# Patient Record
Sex: FEMALE | Race: WHITE | NOT HISPANIC OR LATINO | URBAN - METROPOLITAN AREA
[De-identification: names, ages, dates, MRNs, and addresses within clinical notes are randomized per-mention and may not be internally consistent; named-entity substitution may affect disease eponyms.]

---

## 2017-06-26 ENCOUNTER — OFFICE VISIT - HEALTHEAST (OUTPATIENT)
Dept: FAMILY MEDICINE | Facility: CLINIC | Age: 37
End: 2017-06-26

## 2017-06-26 DIAGNOSIS — N97.9 FEMALE FERTILITY PROBLEM: ICD-10-CM

## 2017-06-26 DIAGNOSIS — M54.9 BACKACHE: ICD-10-CM

## 2017-06-26 DIAGNOSIS — M54.2 CERVICALGIA: ICD-10-CM

## 2017-06-26 ASSESSMENT — MIFFLIN-ST. JEOR: SCORE: 1525.9

## 2017-07-12 ENCOUNTER — HOSPITAL ENCOUNTER (OUTPATIENT)
Dept: PHYSICAL MEDICINE AND REHAB | Facility: CLINIC | Age: 37
Discharge: HOME | End: 2017-07-12
Attending: FAMILY MEDICINE

## 2017-07-12 DIAGNOSIS — M54.50 CHRONIC BILATERAL LOW BACK PAIN WITHOUT SCIATICA: ICD-10-CM

## 2017-07-12 DIAGNOSIS — M79.18 MYOFASCIAL PAIN: ICD-10-CM

## 2017-07-12 DIAGNOSIS — G89.29 CHRONIC NECK PAIN: ICD-10-CM

## 2017-07-12 DIAGNOSIS — G89.29 CHRONIC BILATERAL LOW BACK PAIN WITHOUT SCIATICA: ICD-10-CM

## 2017-07-12 DIAGNOSIS — M54.2 CHRONIC NECK PAIN: ICD-10-CM

## 2017-07-12 DIAGNOSIS — R29.898 MUSCULAR DECONDITIONING: ICD-10-CM

## 2017-07-12 RX ORDER — IBUPROFEN 200 MG
200 TABLET ORAL EVERY 6 HOURS
Status: SHIPPED | COMMUNITY
Start: 2017-07-12

## 2017-07-12 ASSESSMENT — MIFFLIN-ST. JEOR: SCORE: 1525.9

## 2017-07-14 ENCOUNTER — HOSPITAL ENCOUNTER (OUTPATIENT)
Dept: PHYSICAL MEDICINE AND REHAB | Facility: CLINIC | Age: 37
Discharge: HOME | End: 2017-07-14
Attending: PHYSICIAN ASSISTANT

## 2017-07-14 DIAGNOSIS — M99.04 SACRAL REGION SOMATIC DYSFUNCTION: ICD-10-CM

## 2017-07-14 DIAGNOSIS — M99.02 SEGMENTAL DYSFUNCTION OF THORACIC REGION: ICD-10-CM

## 2017-07-14 DIAGNOSIS — M54.2 CHRONIC NECK PAIN: ICD-10-CM

## 2017-07-14 DIAGNOSIS — M79.18 MYOFASCIAL PAIN: ICD-10-CM

## 2017-07-14 DIAGNOSIS — M99.01 CERVICAL SEGMENT DYSFUNCTION: ICD-10-CM

## 2017-07-14 DIAGNOSIS — G89.29 CHRONIC BILATERAL LOW BACK PAIN WITHOUT SCIATICA: ICD-10-CM

## 2017-07-14 DIAGNOSIS — M54.50 CHRONIC BILATERAL LOW BACK PAIN WITHOUT SCIATICA: ICD-10-CM

## 2017-07-14 DIAGNOSIS — M99.04 SACROILIAC JOINT SOMATIC DYSFUNCTION: ICD-10-CM

## 2017-07-14 DIAGNOSIS — G89.29 CHRONIC NECK PAIN: ICD-10-CM

## 2017-07-14 DIAGNOSIS — M99.03 SEGMENTAL DYSFUNCTION OF LUMBAR REGION: ICD-10-CM

## 2017-07-17 ENCOUNTER — OFFICE VISIT - HEALTHEAST (OUTPATIENT)
Dept: PHYSICAL THERAPY | Facility: CLINIC | Age: 37
End: 2017-07-17

## 2017-07-17 DIAGNOSIS — M54.2 NECK PAIN: ICD-10-CM

## 2017-07-17 DIAGNOSIS — M54.50 CHRONIC BILATERAL LOW BACK PAIN WITHOUT SCIATICA: ICD-10-CM

## 2017-07-17 DIAGNOSIS — G89.29 CHRONIC BILATERAL LOW BACK PAIN WITHOUT SCIATICA: ICD-10-CM

## 2017-07-17 DIAGNOSIS — R29.898 NECK TIGHTNESS: ICD-10-CM

## 2017-07-17 DIAGNOSIS — M62.81 GENERALIZED MUSCLE WEAKNESS: ICD-10-CM

## 2017-07-18 ENCOUNTER — HOSPITAL ENCOUNTER (OUTPATIENT)
Dept: PHYSICAL MEDICINE AND REHAB | Facility: CLINIC | Age: 37
Discharge: HOME | End: 2017-07-18
Attending: PHYSICIAN ASSISTANT

## 2017-07-18 DIAGNOSIS — M99.04 SACRAL REGION SOMATIC DYSFUNCTION: ICD-10-CM

## 2017-07-18 DIAGNOSIS — G89.29 CHRONIC BILATERAL LOW BACK PAIN WITHOUT SCIATICA: ICD-10-CM

## 2017-07-18 DIAGNOSIS — G89.29 CHRONIC NECK PAIN: ICD-10-CM

## 2017-07-18 DIAGNOSIS — M54.50 CHRONIC BILATERAL LOW BACK PAIN WITHOUT SCIATICA: ICD-10-CM

## 2017-07-18 DIAGNOSIS — M99.04 SACROILIAC JOINT SOMATIC DYSFUNCTION: ICD-10-CM

## 2017-07-18 DIAGNOSIS — M99.02 SEGMENTAL DYSFUNCTION OF THORACIC REGION: ICD-10-CM

## 2017-07-18 DIAGNOSIS — M99.01 CERVICAL SEGMENT DYSFUNCTION: ICD-10-CM

## 2017-07-18 DIAGNOSIS — M54.2 CHRONIC NECK PAIN: ICD-10-CM

## 2017-07-18 DIAGNOSIS — M79.18 MYOFASCIAL PAIN: ICD-10-CM

## 2017-07-18 DIAGNOSIS — M99.03 SEGMENTAL DYSFUNCTION OF LUMBAR REGION: ICD-10-CM

## 2017-07-21 ENCOUNTER — OFFICE VISIT - HEALTHEAST (OUTPATIENT)
Dept: PHYSICAL THERAPY | Facility: CLINIC | Age: 37
End: 2017-07-21

## 2017-07-21 DIAGNOSIS — M62.81 GENERALIZED MUSCLE WEAKNESS: ICD-10-CM

## 2017-07-21 DIAGNOSIS — G89.29 CHRONIC BILATERAL LOW BACK PAIN WITHOUT SCIATICA: ICD-10-CM

## 2017-07-21 DIAGNOSIS — M54.50 CHRONIC BILATERAL LOW BACK PAIN WITHOUT SCIATICA: ICD-10-CM

## 2017-07-21 DIAGNOSIS — R29.898 NECK TIGHTNESS: ICD-10-CM

## 2017-07-21 DIAGNOSIS — M54.2 NECK PAIN: ICD-10-CM

## 2017-08-04 ENCOUNTER — OFFICE VISIT - HEALTHEAST (OUTPATIENT)
Dept: PHYSICAL THERAPY | Facility: CLINIC | Age: 37
End: 2017-08-04

## 2017-08-04 DIAGNOSIS — M54.2 NECK PAIN: ICD-10-CM

## 2017-08-04 DIAGNOSIS — G89.29 CHRONIC BILATERAL LOW BACK PAIN WITHOUT SCIATICA: ICD-10-CM

## 2017-08-04 DIAGNOSIS — M54.50 CHRONIC BILATERAL LOW BACK PAIN WITHOUT SCIATICA: ICD-10-CM

## 2017-08-07 ENCOUNTER — OFFICE VISIT - HEALTHEAST (OUTPATIENT)
Dept: PHYSICAL THERAPY | Facility: CLINIC | Age: 37
End: 2017-08-07

## 2017-08-07 DIAGNOSIS — R29.898 NECK TIGHTNESS: ICD-10-CM

## 2017-08-07 DIAGNOSIS — G89.29 CHRONIC BILATERAL LOW BACK PAIN WITHOUT SCIATICA: ICD-10-CM

## 2017-08-07 DIAGNOSIS — M54.50 CHRONIC BILATERAL LOW BACK PAIN WITHOUT SCIATICA: ICD-10-CM

## 2017-08-07 DIAGNOSIS — M54.2 NECK PAIN: ICD-10-CM

## 2017-08-07 DIAGNOSIS — M62.81 GENERALIZED MUSCLE WEAKNESS: ICD-10-CM

## 2017-08-11 ENCOUNTER — OFFICE VISIT - HEALTHEAST (OUTPATIENT)
Dept: PHYSICAL THERAPY | Facility: CLINIC | Age: 37
End: 2017-08-11

## 2017-08-11 DIAGNOSIS — M54.50 CHRONIC BILATERAL LOW BACK PAIN WITHOUT SCIATICA: ICD-10-CM

## 2017-08-11 DIAGNOSIS — R29.898 NECK TIGHTNESS: ICD-10-CM

## 2017-08-11 DIAGNOSIS — M54.2 NECK PAIN: ICD-10-CM

## 2017-08-11 DIAGNOSIS — G89.29 CHRONIC BILATERAL LOW BACK PAIN WITHOUT SCIATICA: ICD-10-CM

## 2017-08-11 DIAGNOSIS — M62.81 GENERALIZED MUSCLE WEAKNESS: ICD-10-CM

## 2017-08-14 ENCOUNTER — OFFICE VISIT - HEALTHEAST (OUTPATIENT)
Dept: PHYSICAL THERAPY | Facility: CLINIC | Age: 37
End: 2017-08-14

## 2017-08-14 DIAGNOSIS — M54.50 CHRONIC BILATERAL LOW BACK PAIN WITHOUT SCIATICA: ICD-10-CM

## 2017-08-14 DIAGNOSIS — M62.81 GENERALIZED MUSCLE WEAKNESS: ICD-10-CM

## 2017-08-14 DIAGNOSIS — G89.29 CHRONIC BILATERAL LOW BACK PAIN WITHOUT SCIATICA: ICD-10-CM

## 2017-08-14 DIAGNOSIS — R29.898 NECK TIGHTNESS: ICD-10-CM

## 2017-08-14 DIAGNOSIS — M54.2 NECK PAIN: ICD-10-CM

## 2017-08-15 ENCOUNTER — COMMUNICATION - HEALTHEAST (OUTPATIENT)
Dept: OBGYN | Facility: CLINIC | Age: 37
End: 2017-08-15

## 2017-08-18 ENCOUNTER — OFFICE VISIT - HEALTHEAST (OUTPATIENT)
Dept: PHYSICAL THERAPY | Facility: CLINIC | Age: 37
End: 2017-08-18

## 2017-08-18 DIAGNOSIS — R29.898 NECK TIGHTNESS: ICD-10-CM

## 2017-08-18 DIAGNOSIS — M62.81 GENERALIZED MUSCLE WEAKNESS: ICD-10-CM

## 2017-08-18 DIAGNOSIS — M54.2 NECK PAIN: ICD-10-CM

## 2017-08-18 DIAGNOSIS — M54.50 CHRONIC BILATERAL LOW BACK PAIN WITHOUT SCIATICA: ICD-10-CM

## 2017-08-18 DIAGNOSIS — G89.29 CHRONIC BILATERAL LOW BACK PAIN WITHOUT SCIATICA: ICD-10-CM

## 2017-08-21 ENCOUNTER — OFFICE VISIT - HEALTHEAST (OUTPATIENT)
Dept: PHYSICAL THERAPY | Facility: CLINIC | Age: 37
End: 2017-08-21

## 2017-08-21 DIAGNOSIS — M62.81 GENERALIZED MUSCLE WEAKNESS: ICD-10-CM

## 2017-08-21 DIAGNOSIS — G89.29 CHRONIC BILATERAL LOW BACK PAIN WITHOUT SCIATICA: ICD-10-CM

## 2017-08-21 DIAGNOSIS — M54.2 NECK PAIN: ICD-10-CM

## 2017-08-21 DIAGNOSIS — M54.50 CHRONIC BILATERAL LOW BACK PAIN WITHOUT SCIATICA: ICD-10-CM

## 2017-08-21 DIAGNOSIS — R29.898 NECK TIGHTNESS: ICD-10-CM

## 2017-08-22 ENCOUNTER — AMBULATORY - HEALTHEAST (OUTPATIENT)
Dept: OBGYN | Facility: CLINIC | Age: 37
End: 2017-08-22

## 2017-08-22 ENCOUNTER — COMMUNICATION - HEALTHEAST (OUTPATIENT)
Dept: ADMINISTRATIVE | Facility: CLINIC | Age: 37
End: 2017-08-22

## 2017-08-22 DIAGNOSIS — Z98.51 H/O TUBAL LIGATION: ICD-10-CM

## 2017-09-01 ENCOUNTER — OFFICE VISIT - HEALTHEAST (OUTPATIENT)
Dept: PHYSICAL THERAPY | Facility: CLINIC | Age: 37
End: 2017-09-01

## 2017-09-01 DIAGNOSIS — M62.81 GENERALIZED MUSCLE WEAKNESS: ICD-10-CM

## 2017-09-01 DIAGNOSIS — M54.2 NECK PAIN: ICD-10-CM

## 2017-09-01 DIAGNOSIS — M54.50 CHRONIC BILATERAL LOW BACK PAIN WITHOUT SCIATICA: ICD-10-CM

## 2017-09-01 DIAGNOSIS — R29.898 NECK TIGHTNESS: ICD-10-CM

## 2017-09-01 DIAGNOSIS — G89.29 CHRONIC BILATERAL LOW BACK PAIN WITHOUT SCIATICA: ICD-10-CM

## 2017-09-26 ENCOUNTER — COMMUNICATION - HEALTHEAST (OUTPATIENT)
Dept: PHYSICAL THERAPY | Facility: CLINIC | Age: 37
End: 2017-09-26

## 2021-05-31 ENCOUNTER — RECORDS - HEALTHEAST (OUTPATIENT)
Dept: ADMINISTRATIVE | Facility: CLINIC | Age: 41
End: 2021-05-31

## 2021-05-31 VITALS — WEIGHT: 178 LBS | BODY MASS INDEX: 26.98 KG/M2 | HEIGHT: 68 IN

## 2021-05-31 VITALS — BODY MASS INDEX: 26.97 KG/M2 | WEIGHT: 177.4 LBS

## 2021-05-31 VITALS — BODY MASS INDEX: 26.98 KG/M2 | HEIGHT: 68 IN | WEIGHT: 178 LBS

## 2021-05-31 VITALS — BODY MASS INDEX: 26.99 KG/M2 | WEIGHT: 177.5 LBS

## 2021-06-01 ENCOUNTER — RECORDS - HEALTHEAST (OUTPATIENT)
Dept: ADMINISTRATIVE | Facility: CLINIC | Age: 41
End: 2021-06-01

## 2021-06-11 NOTE — PROGRESS NOTES
SUBJECTIVE:   Anay Rosas is a 37 y.o. female [facility]  Patient reports that her pain is still the same.  She rates a 4/10 of the neck and back.  She is requesting to see a chiropractor this week    PROCEDURE:  37 y.o. female with cervical, thoracic, lumbar, sacral and sacroiliac somatic dysfunctions.      PRE-PROCEDURE DIAGNOSIS: Myofascial pain, chronic neck and lower back pain      PROCEDURE PERFORMED: joint manipulations.      Brief Procedure: The patient understands the risks and benefits of spinal manipulations      Procedure:  The patient has pain, tenderness and spasm at cervical, thoracic and lumbosacral region.  C2, C4, T2, T3, T5, T8, T10, L2, L4, left sacral and right SI joint malrotated which are determined by static and motion palpations. Patient was placed on table supine, prone and side posture. The cervical, thoracic, lumbar, sacral and sacroiliac were manipulated by hands.  Stretched the bilateral scalenes.  The patient tolerated the manipulations and scalene stretches without any complications.    DIAGNOSIS:  Diagnoses and all orders for this visit:    Myofascial pain    Chronic neck pain    Chronic bilateral low back pain without sciatica    Cervical segment dysfunction    Segmental dysfunction of thoracic region    Segmental dysfunction of lumbar region    Sacral region somatic dysfunction    Sacroiliac joint somatic dysfunction      DISCUSSION/PLAN:  This is a 37 y.o. female with medical history significant of anxiety who presents today for spinal manipulation.  Patient pain is stable with spinal manipulation of the chronic neck and back pain.     5 regions were manipulated.     Plan:    Follow-up next week was to spinal manipulation.    Patient is okay to see Dr. Paulo Gipson chiropractic treatments.      Farrukh Arroyo DC, DONG, PARosalioC

## 2021-06-11 NOTE — PROGRESS NOTES
[facility]   [facility]-Pelvic Initial Evaluation    Patient Name: Anay Rosas  Date of evaluation: 7/17/2017  Referral Diagnosis: Myofascial pain  Referring provider: Farrukh Arroyo, *  Visit Diagnosis:     ICD-10-CM    1. Neck pain M54.2    2. Chronic bilateral low back pain without sciatica M54.5     G89.29    3. Neck tightness R29.898    4. Generalized muscle weakness M62.81        Assessment:     Anay Rosas is a 37 y.o. female who presents to therapy today with chief complaints of neck pain and chronic LBP without a known injury. Neck pain has been ongoing since February of 2017 when she began after starting her new job as a . LBP has been ongoing for at least 3 years. LBP is primarily present with prolonged standing while neck pain with sitting/driving. Pt presents with significant tightness in neck musculature and sub-occipitals which is believed to be contributing to regular headaches. Hypermobility is also evident with PA testing throughout the spine. Pt is appropriate to continue with MedX testing and program for further strengthening. Pt reported h/o obesity and depression.  Pain symptoms are not improving.  Functional impairments include prolonged sitting/standing.     Impairments in  pain, posture, ROM, joint mobility, strength, ADL's  The POC is dynamic and will be modified on an ongoing basis.  Patient will return to clinic if symptoms persist.  Barriers to achieving goals as noted in the assessment section may affect outcome.  Prognosis to achieve goals is  good   Skilled PT is required to improve strength/stability, posture, flexibility, ADL function, and reduce pain.  Pt. is appropriate for skilled PT intervention as outlined in the Plan of Care (POC).  Pt. is a good candidate for skilled PT services to improve pain levels and function.    Goals:  Pt will: be independent with HEP within 3 weeks.  Pt will: be able to stand for 45 minute pain-free within 6 weeks.  Pt  will: be able to drive for 60 miniutes or more without pain within 8 weeks.  Pt will: decrease JENNIFER by 30% and NDI by 5 points to demonstrate improved function within 8 weeks.    Patient's expectations/goals are realistic.    Barriers to Learning or Achieving Goals:  No Barriers.       Plan / Patient Instructions:        Plan of Care:   Communication with: Referral Source  Patient Related Instruction: Nature of Condition;Body mechanics;Posture;Treatment plan and rationale;Precautions;Next steps;Self Care instruction;Expected outcome;Basis of treatment  Times per Week: 1-2  Number of Weeks: 8-12  Number of Visits: 14-16  Discharge Planning: Pt will be discharged when all goals are met, lack of progress or worsening condition, MD referral, and/or pt desires to continue with HEP independently.  Therapeutic Exercise: ROM;Stretching;Strengthening;Other  Therapeutic Exercise : MedX  Neuromuscular Reeducation: posture;core;TNE  Manual Therapy: soft tissue mobilization;myofascial release;joint mobilization;muscle energy;strain counterstrain  Modalities: electrical stimulation;TENS;iontophoresis;ultrasound;cold pack;hot pack  Functional Training (ADL's): self care;safety procedures;instructions in transfers;instructions for equipment;meal prep;ADL's;ergonomics  Equipment: theraband;TENS unit    Plan for next visit: review HEP, MedX testing on cervical and lumbar, chin tucks if time, may benefit from further STM and sub-occipital release due to muscle tightness, foot assessment     Subjective:   Social information:   Occupation: , [employer]   Work Status:Working full time   Equipment Available: None    History of Present Illness:    Anay is a 37 y.o. female who presents to therapy today with complaints of chronic neck and LBP. Pt noticed worsening neck pain since starting her new job as a  in February of 2017. Pt has had LBP with standing for approximately 3 years, but no injury. She states that the neck  pain is worse and worse with driving. She does exercise regularly and does not noticed the back or neck pain. Symptoms are intermittent and not improving. She denies history of similar symptoms. She describes their previous level of function as not limited.    Pain Ratin  Pain rating at best: 2  Pain rating at worst: 6  Pain description: tightness    Functional limitations are described as occurring with:   sitting : 1 hour tolerance  standing : 45 minute tolerance  driving    Patient reports benefit from:  ibuprofen         Objective:      Note: Items left blank indicates the item was not performed or not indicated at the time of the evaluation.    Patient Outcome Measures :    Modified Oswestry Low Back Pain Disablity Questionnaire  in %: 20   Scores range from 0-100%, where a score of 0% represents minimal pain and maximal function. The minimal clinically important difference is a score reduction of 12%.  Neck Disability Score in %: 14   Scores range from 0-100%, where a score of 0% represents minimal pain and maximal function. The minmal clinically important difference is a score reduction of 10%.    Examination  1. Neck pain     2. Chronic bilateral low back pain without sciatica     3. Neck tightness     4. Generalized muscle weakness       Involved side: Bilateral  Posture Observation:      General sitting posture is  fair.  General standing posture is fair.    Lumbar ROM:    Date: 17     *Indicate scale AROM AROM AROM   Lumbar Flexion Min restriction     Lumbar Extension WFL      Right Left Right Left Right Left   Lumbar Sidebending Min restriction Min restriction       Lumbar Rotation WFL WFL       Thoracic Flexion      Thoracic Extension      Thoracic Sidebending         Thoracic Rotation           Lower Extremity Strength:     Date: 17     LE strength/5 Right Left Right Left Right Left   Hip Flexion (L1-3) 5 5       Hip Extension (L5-S1)         Hip Abduction (L4-5) 5- 5-       Hip Adduction  (L2-3) 5 5       Hip External Rotation         Hip Internal Rotation         Knee Extension (L3-4) 5 5       Knee Flexion         Ankle Dorsiflexion (L4-5)         Great Toe Extension (L5)         Ankle Plantar flexion (S1)         Abdominals        Cervical ROM  Date: 7/17/17     *Indicate scale AROM AROM AROM   Cervical Flexion WFL (tightness)     Cervical Extension WFL      Right Left Right Left Right Left   Cervical Sidebending WFL WFL       Cervical Rotation WFL WFL       Cervical Protraction      Cervical Retraction      Thoracic Flexion      Thoracic Extension      Thoracic Sidebending         Thoracic Rotation           Strength  Date: 7/17/17     Cervical Myotomes/5 Right Left Right Left Right Left   Cervical Flexion (C1-2)         Cervical Sidebending (C3)         Shoulder Elevation (C4) 5 5       Shoulder Abduction (C5) 5 5       Elbow Flexion (C6) 5 5       Elbow Extension (C7) 5 5       Wrist Flexion (C7)         Wrist Extension (C6)         Thumb abduction (C8)         Finger Abduction (T1)           Sensation: not impaired    Palpation: B upper trap tendenerss    Lumbar Special Tests:     Lumbar Special Tests Right Left SI Tests Right  Left   Quadrant test   SI Compression - -   Straight leg raise - - SI Distraction     Crossover response   POSH Test     Slump   Sacral Thrust     Sit-up test  FADIR     Trunk extensor endurance test  Resisted Abduction     Prone instability test  Other:     Pubic shotgun  Other:       Cervical Special Tests  Cervical Special Tests Right Left UE Nerve Mobility Right Left   Cervical compression   Median nerve     Cervical distraction - (tightness) - (tightness) Ulnar nerve     Spurling s test   Radial nerve     Shoulder abduction sign   Thoracic outlet     Deep neck flexor endurance test   Carmen     Upper cervical rotation   Adson s     Sharper-Judson   Cervical rotation lateral flexion     Alar ligament test   Other:     Other:   Other:         Repeated Motion Testing:    Not indicated    Passive Mobility - Joint Integrity:  Hypermobile    Treatment Today     TREATMENT MINUTES COMMENTS   Evaluation 30    Self-care/ Home management     Manual therapy 10 In supine:  - STM/MFR to B cervical paraspinals  - sub-occipital release   Neuromuscular Re-education     Therapeutic Activity     Therapeutic Exercises 15 See exercises. Educated on HEP and MedX program   Gait training     Modality__________________                Total 55    Blank areas are intentional and mean the treatment did not include these items.     PT Evaluation Code: (Please list factors)  Patient History/Comorbidities: depression, obesity  Examination: neck pain, B LBP, neck tightness, weakness  Clinical Presentation: stable  Clinical Decision Making: low    Patient History/  Comorbidities Examination  (body structures and functions, activity limitations, and/or participation restrictions) Clinical Presentation Clinical Decision Making (Complexity)   No documented Comorbidities or personal factors 1-2 Elements Stable and/or uncomplicated Low   1-2 documented comorbidities or personal factor 3 Elements Evolving clinical presentation with changing characteristics Moderate   3-4 documented comorbidities or personal factors 4 or more Unstable and unpredictable High          Shahab Fonseca, PT, DPT  7/17/2017  11:16 AM

## 2021-06-11 NOTE — PROGRESS NOTES
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  Here to get a referral for the best price to see a chiropractor.  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  Neck shoulder and lower back pain uncomfortable but has never gotten in the way of working or life.  Did have issues prior to the job.  In the past was same but less frequent.  Has not been seen by chiro since son born six years ago.  No radicular sxs.    hosp surg tubal ligation.   Wants reversal.  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  .  Two kids.   9 and will be [age] in October.      ROS:  Constitutional: denies fever  Vision: denies change in vision  ENT: denies cough  Resp: denies shortness of breath  Card: denies palpitations  GI: denies vomiting or abnormal stool, melena, hematochezia  : denies dysuria  Neuro: denies numbness or weakness  Derm: denies rash  Joints: denies redness or swelling  Endo: denies polyuria  Mental health: mood is good  Extremities: no edema  Otherwise negative review of systems    ROS: as noted above    OBJECTIVE:   Vitals:    06/26/17 1119   BP: 112/84   Pulse: 66   Resp: 16   Temp: 97.4  F (36.3  C)      Head: atraumatic   Eyes: nl eom, anicteric   Ears: nl external ears   Neck: nl nodes, supple   Lungs: clear to ausc   Heart: regular rhythm  Back: no tenderness  Abd: soft nontender   Joints: uninflamed   Ext: nontender calves   Mental: euthymic  Neuro: no weakness  Gait: normal    Gait: normal  Spine: No back tenderness.  Bends forward to toes  Neuro: Able to walk on heels and toes. In sitting position demonstrates equal strength with dorsiflexion, eversion and inversion of ankles.  Reflexes ankles and knees symmetric  Negative straight leg raise  Skin: no rash  Neuro: Normal strength of interossei, normal , wrist extension and flexion.  Normal biceps strength.  Musculoskeletal: Normal shoulder range of motion.  Neck: Normal neck range of motion and negative head  compression sign.  Skin: no rash  Adenopathy: normal    ASSESSMENT/PLAN:    Three new problems  Discussed likely non covered reversal of tl  Discussed possible chiro options at spine    1. Female fertility problem  Ambulatory referral to Obstetrics / Gynecology   2. Backache  [facility] referral [facility]   3. Cervicalgia  Ambulatory referral to [facility]

## 2021-06-11 NOTE — PROGRESS NOTES
SUBJECTIVE:   Anay Rosas is a 37 y.o. female is [facility].  She has been to a chiropractor in the past.  Today, she rates the pain a 7/10 of the neck and lower back because she has been driving at work.    PROCEDURE:  37 y.o. female with cervical, thoracic, lumbar, sacral and sacroiliac somatic dysfunctions.      PRE-PROCEDURE DIAGNOSIS: Myofascial pain, chronic neck and lower back pain      PROCEDURE PERFORMED: joint manipulations.      Brief Procedure: The patient understands the risks and benefits of spinal manipulations      Procedure:  The patient has pain, tenderness and spasm at cervical, thoracic and lumbosacral region.  C1, C2, C4, T2, T3, T5, T6, T8, T9, L3, L5, left sacral and right SI joint malrotated which are determined by static and motion palpations. Patient was placed on table supine, prone and side posture. The cervical, thoracic, lumbar, sacral and sacroiliac were manipulated by hands.  Stretched the bilateral scalenes.  The patient tolerated the manipulations and scalene stretches without any complications.    DIAGNOSIS:  Diagnoses and all orders for this visit:    Myofascial pain    Chronic neck pain    Chronic bilateral low back pain without sciatica    Cervical segment dysfunction    Segmental dysfunction of thoracic region    Segmental dysfunction of lumbar region    Sacral region somatic dysfunction    Sacroiliac joint somatic dysfunction      DISCUSSION/PLAN:  This is a 37 y.o. female with medical history significant of anxiety who presents today for spinal manipulation.  Patient started first spinal manipulation and has 3 more visits.  She had no complication and was advised to use ice for increased soreness..  5 regions were manipulated. \    Plan:    Home instructions: ice QID for 10-15 minutes.    Patient will follow up next week for spinal manipulations.    Frequency: 3 more spinal manipulation        Farrukh Arroyo DC, MSPAS, PA-C

## 2021-06-11 NOTE — PROGRESS NOTES
Assessment/Plan:    Diagnoses and all orders for this visit:    Myofascial pain  -     Ambulatory referral to Physical Therapy    Chronic neck pain  -     Ambulatory referral to Physical Therapy    Chronic bilateral low back pain without sciatica  -     Ambulatory referral to Physical Therapy    Muscular deconditioning  -     Ambulatory referral to Physical Therapy        Discussion:  This is a 37 y.o. female with no history significant of anxiety who present today for new patient evaluation of chronic neck and lower back pain.  She is neurologically intact and no focal weakness.  Patient does not have any red flag symptoms.  1.  Chronic neck pain: She has bilateral neck pain that the right side is worse than the left and radiates into the shoulders that started getting worse 4 months ago.  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  Her pain is myofascial etiology.  We will have the patient do core strengthening physical therapy MedX of the neck and lower back.  She can continue taking the  mg ibuprofen as needed for pain.  We will have her do spinal manipulation for couple weeks.    2.  Chronic lower back pain: Chronic lower back pain started 6 years ago for the last pregnancy.  Patient has deconditioned syndrome of the erector spinae muscles and abdominal muscles that is causing some of her pain which is myofascial.  She will benefit in the core strengthening physical therapy.  Have her take ibuprofen and doing spinal manipulation to promote function decrease pain.    NDI Score: 16  WHO 5: 18    PLAN:    This was a shared treatment plan.  Patient fully understands the nature of the problem.  Patient agreed with the plan.    DIAGNOSTIC:  No imaging is ordered because she does not have any red flag symptoms.  We will order imaging if she does not improve with conservative care.    INTERVENTIONAL PAIN MANAGEMENT:    No injection indicated at this time.    PHYSICAL THERAPY  AND CHIROPRACTIC CARE:    She will go to Physical therapy at [facility] for 8-12 weeks.  Patient will do spinal manipulation 2 times a week for 2 weeks    MEDICATIONS:    She can continue with over-the-counter 600 mg ibuprofen.  Recommend patient can try over-the-counter topical analgesics.    PATIENT EDUCATION:  Educated the patient on her condition and treatment plan.    The patient is in agreement with the above plan. All questions were answered.    FOLLOW-UP:   Patient will follow up within 1 week to start spinal manipulation.    50 minutes of total visit time was spent face to face with the patient today 60% of the visit was spent on counseling, education, and coordinating care.     This note has been dictated using voice recognition software. Any grammatical or context distortions are unintentional and inherent to the software         Farrukh Arroyo DC, KOBI UMANA         History of Present Ilness:   Anay Rosas is a 37 y.o. female comes to [facility] for an initial evaluation of chronic neck and lower back pain upon the requests of Haresh Way MD. patient reports that her neck is worse than her lower back.  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  Her neck pain has worsened 4 months ago.  She described the 2/10 pain as intermittent stabbing bilateral neck that radiates into the shoulders when aggravated.  Her neck is aggravated with sitting, driving and turning her neck.  The neck pain is better by taking 600 mg ibuprofen twice a day.  She denies of any radicular arm symptoms.  She denies of any upper extremity paresthesia or weakness.    She reports that she started having chronic lower back pain 6 years ago during and after her pregnancy of her second child.  She describes pain as 7/10 intermittent pulling of the bilateral lower back which does not radiate down her legs.  Pain is worse with prolonged standing for about  45 minutes.  The pain is better with sitting.  Patient denies of any urinary/bowel incontinence or retention.  She does not have any radiation into her legs or weakness.    PAST MEDICAL HISTORY:    Past Medical History:   Diagnosis Date     Anxiety        No past surgical history on file.    No current outpatient prescriptions on file prior to encounter.     No current facility-administered medications on file prior to encounter.        FAMILY MEDICAL HISTORY:  I reviewed the family history which are negative for any chronic diseases.    SOCIAL HISTORY:   Social History   Substance Use Topics     Smoking status: Never Smoker     Smokeless tobacco: None     Alcohol use None       REVIEW OF SYMPTOMS:  Constitutional: She denies of any fever, night sweats and unexplained weight loss.  Eyes: negative  Respiratory: negative   Cardiovascular: negative    Gastrointestinal: Negative  Genitourinary: Negative  Musculoskeletal: Negative  Skin: Negative  Neurological: Negative  Psychological: Anxiety  All other systems reviewed and are negative.    EVALUATION TO DATE:    No imaging of the neck lower back    TREATMENT TO DATE:   No treatment    Objective:   CONSTITUTIONAL: slim built. Not in acute distress.  PSYCHIATRIC:  Judgment and insight are intact.  Alert and oriented.  Mood and affect are appropriate.  EYES: clear conjunctiva  ENT: supple and no thyromegaly   HEAD: NC/AT  MUSCULOSKELETAL:  Gait: ambulatory.  Heel and Toe walk intact without difficulties. normal gait.   Motor Volition:able to go from a sitting to standing position and sitting on the table without difficulities. Head lift test is negative.  Neck: Cervical spine: cervical ROM is full without any pain. Forearm flexors 5/5, Forearm extensors 5/5, Shoulder abductors 5/5, Wrist extensors 5/5, Wrist flexors 5/5, Finger abductors 5/5, Finger adductors 5/5.  Maximal Compression Test is negative. Shoulder Depression Test is positive on the left because of  right-sided neck pain. Spurling's Test is positive on the right cervical Distraction Test is positive on the right.Palpatory tenderness of the bilateral C2 through C6 paraspinal.  Thoracic spine: No palpatory tenderness of paraspinal or trapezius muscle.  Lumbar Spine:  Foot evertors 5/5, Foot invertors  5/5, Foot dorsiflexor 5/5, Foot plantarflexors  5/5, Leg extensors  5/5, Leg flexors 5/5, Hip abductor 5/5, Hip adductor 5/5. Straight leg Raise is negative. Leg lift test is positive for lower back pain. Palpatory tenderness of left L2 through L5 paraspinal.  Hip: Yeoman Test is negative. No tenderness of posterior thigh or calf.  NEUROLOGIC: Aguilar test is negative. Bakinski test is negative. Brachioradialis  2/4, Triceps   2/4, and Biceps  2/4 DTR are symmetrical. Patella  2/4 and Achilles  2/4 DTR are symmetrical.  C4-T1 dermatomes are intact. L3-S1 dermatomes are intact. Muscle tone is normal.  LYMPHATIC: No cervical and groin adenopathy.  SKIN:  No lesion of the epidermis or subcutaneous tissue of the cervical, thoracic and lumbar spine.  RESPIRATORY:  Effort is normal.  GASTROINTESTINAL: flat habitus.Soft and nontender in all four quadrants. No palpable masses felt.

## 2021-06-12 NOTE — PROGRESS NOTES
[facility] Daily Progress Note    Patient Name: Anay ARTEAGA Alison  Date of evaluation: 2017  Today's Date: 2017   Visit Number:  (MedX)  Referral Diagnosis: Myofascial pain  Referring provider: Farrukh Arroyo, *  Visit Diagnosis:     ICD-10-CM    1. Neck pain M54.2    2. Chronic bilateral low back pain without sciatica M54.5     G89.29    3. Neck tightness R29.898    4. Generalized muscle weakness M62.81        Assessment:       Pt has been more fatigued with MedX lumbar, but continues high repetitions with cervical. No complaints of pain or tightness during session. Pt educated on the importance of posture and shoulder elevation related to her neck pain/tightness. No changes to HEP today (see below).     Started 5 minutes late, limited manual and exercises for HEP    Goals:  Pt will: be independent with HEP within 3 weeks.  Pt will: be able to stand for 45 minute pain-free within 6 weeks.  Pt will: be able to drive for 60 miniutes or more without pain within 8 weeks.  Pt will: decrease JENNIFER by 30% and NDI by 5 points to demonstrate improved function within 8 weeks.     Plan / Patient Instructions:       Continue with PT per POC.  Plan for next visit: continue cervical MedX DE and rotary torso, lumbar MedX DE, can continue 4 way neck, continue core and cervical strengthening progression, KT?    Subjective:     Pain rating: not reported  Pt feels that her pain has improved and less overall. She continues to notice most of her pain with driving. She feels her pain has improved 50% since beginning PT.      Objective:       Lumbar MedX Initial testin17 4-week Re-test   AROM (full=  0-72  lumbar) 0-60 deg    Max Extension Torque  231#    Average Extension Torque 178#    Flex: ext ratio (ideal 1.4:1) 2.43:1        MED X Testing Cervical Initial - 2017   AROM (full ROM 0-126) 18-96   Max Torque  227   Flex/ext Ratio (ideal 1.4:1) .7:1     Exercises:  Exercise #1: TA sets: x10, 5 sec  holds  Comment #1: Scap Sets x10 with 5 sec holds  Exercise #2: Levator stretch: x3 B with 15 sec holds  Comment #2: treadmill 5:00  Exercise #3: Rotary torso: started to L, 90 seconds, 30#  Comment #3: Reviewed: seated figure 4 stretch, trunk flex/ext, trunk rotation  Exercise #4: Chin tucks: verbal review and discussed finding this position in sitting more frequently while driving to decrease FHP  Comment #4: 4 way neck: 24# flexion and SB B, x20 each, seat 1, lateral 3  Exercise #5: standing shoulder stretch, neck rotation, scap sets, back extension.     Fatigues more appropriately with MedX DE (lumbar)  Continues high repetitions with MedX DE for cervical    No evident tightness in neck throughout session    Educated on limiting shoulder elevation while driving for neck tightness    Treatment Today     TREATMENT MINUTES COMMENTS   Evaluation     Self-care/ Home management     Manual therapy     Neuromuscular Re-education     Therapeutic Activity     Therapeutic Exercises 26 See exercise and MedX flowsheets,   Gait training     Modality__________________                Total 26    Blank areas are intentional and mean the treatment did not include these items.     Shahab Fonseca, PT, DPT  8/21/2017  11:16 AM

## 2021-06-12 NOTE — PROGRESS NOTES
[facility] Daily Progress Note    Patient Name: Anay Rosas  Date of evaluation: 7/17/2017  Today's Date: 7/21/2017   Visit Number: 2/16 (MedX)  Referral Diagnosis: Myofascial pain  Referring provider: Farrukh Arroyo, *  Visit Diagnosis:     ICD-10-CM    1. Neck pain M54.2    2. Chronic bilateral low back pain without sciatica M54.5     G89.29    3. Neck tightness R29.898    4. Generalized muscle weakness M62.81        Assessment:       Patient tolerated MT well today. Added K-tape to bilat posterolateral neck following levator. Patient will assess how she feels for the next few days.   Patient would benefit from testing MedX next session. She was a  Little late to appt tooday, so there was limiited ability to do test.    HPI from evaluation:  Anay Rosas is a 37 y.o. female who presents to therapy with chief complaints of neck pain and chronic LBP without a known injury. Neck pain has been ongoing since February of 2017 when she began after starting her new job as a . LBP has been ongoing for at least 3 years. LBP is primarily present with prolonged standing while neck pain with sitting/driving. Pt presents with significant tightness in neck musculature and sub-occipitals which is believed to be contributing to regular headaches. Hypermobility is also evident with PA testing throughout the spine. Pt is appropriate to continue with MedX testing and program for further strengthening. Pt reported h/o obesity and depression.  Pain symptoms are not improving.  Functional impairments include prolonged sitting/standing.     Goals:  Pt will: be independent with HEP within 3 weeks.  Pt will: be able to stand for 45 minute pain-free within 6 weeks.  Pt will: be able to drive for 60 miniutes or more without pain within 8 weeks.  Pt will: decrease JENNIFER by 30% and NDI by 5 points to demonstrate improved function within 8 weeks.     Plan / Patient Instructions:        Plan for next visit: review  HEP, MedX testing on cervical and lumbar, chin tucks if time, continue with further STM and sub-occipital release due to muscle tightness, foot assessment. Assess how k-tape was and reapply as needed.     Subjective:     Neck feels really sore today. Unsure if MT helped much last session. Doing the stretches at home.     Objective:       Bilat UT, sub occipital and levator myofascial restrictions.   Patient has difficulty relaxing neck during MT today.     Treatment Today     TREATMENT MINUTES COMMENTS   Evaluation     Self-care/ Home management     Manual therapy 15 In supine:  - STM/MFR to B cervical paraspinals  - sub-occipital release   Neuromuscular Re-education 4 K-Tape application bilat levator 50% stretch, I strips.   Therapeutic Activity     Therapeutic Exercises     Gait training     Modality__________________                Total 19    Blank areas are intentional and mean the treatment did not include these items.     Candie Farris, PT, DPT  7/21/2017  11:16 AM

## 2021-06-12 NOTE — PROGRESS NOTES
[facility] Daily Progress Note    Patient Name: Anay ARTEAGA Alison  Date of evaluation: 7/17/2017  Today's Date: 8/4/2017   Visit Number: 3/16 (MedX)  Referral Diagnosis: Myofascial pain  Referring provider: Farrukh Arroyo, *  Visit Diagnosis:     ICD-10-CM    1. Neck pain M54.2    2. Chronic bilateral low back pain without sciatica M54.5     G89.29        Assessment:       Patient was taken back to PT gym 10 min late today d/t not being checked in and PT not seeing patient in lobby. This was explained to patient. PT Apologized for the confusion.     Patient's neck is non tender today and feels less restricted upon palpation. Was ready for MedX testing and patient agrees that the strengthening is appropriate for her.   She found a new chiropractor to go to and is happy with this. Her neck hasn't been hurting as much since she had an adjustment 2 days ago.   She did have a flare up of back pain yesterday while driving, states this has never happened before. PT gave suggestions for keeping her back moving throughout the workday and discussed that increasing her activity level is important d/t the fac tthat she is no longer in an active job. She agrees.   No need for K tape today, feeling ok.     Goals:  Pt will: be independent with HEP within 3 weeks.  Pt will: be able to stand for 45 minute pain-free within 6 weeks.  Pt will: be able to drive for 60 miniutes or more without pain within 8 weeks.  Pt will: decrease JENNIFER by 30% and NDI by 5 points to demonstrate improved function within 8 weeks.     Plan / Patient Instructions:      Plan for next visit: start DE on neck. Start 4 way neck and add to neck strengthening at home. Test lumbar medX  Only MT if needed when she comes into appts, may not need if her adjustments with chiro are doing well.      Subjective:     Not too sore in neck or back right now.      Objective:       Less tender in cervical paraspinals/sub-os today. Not as tight either.     MED X  Testing Cervical Initial - 8-4-2017   AROM (full ROM 0-126) 18-96   Max Torque  227   Flex/ext Ratio (ideal 1.4:1) .7:1     Exercises during work day:  Seated fig. 4 stretch  Trunk flex/ext  Trunk rotation    Treatment Today     TREATMENT MINUTES COMMENTS   Evaluation     Self-care/ Home management     Manual therapy     Neuromuscular Re-education     Therapeutic Activity     Therapeutic Exercises 20 Tested MedX and explained some stretches to do while at work, see above   Gait training     Modality__________________                Total 19    Blank areas are intentional and mean the treatment did not include these items.     Candie Farris, PT, DPT  8/4/2017  11:16 AM

## 2021-06-12 NOTE — PROGRESS NOTES
[facility] Daily Progress Note    Patient Name: Anay ARTEAGA Alison  Date of evaluation: 2017  Today's Date: 2017   Visit Number:  (MedX)  Referral Diagnosis: Myofascial pain  Referring provider: Farrukh Arroyo, *  Visit Diagnosis:     ICD-10-CM    1. Neck pain M54.2    2. Chronic bilateral low back pain without sciatica M54.5     G89.29    3. Neck tightness R29.898    4. Generalized muscle weakness M62.81        Assessment:       Overall, patient has been reporting some mild improvement in her neck/back pain since starting PT and chiropractic work. She does sit a lot for work and has been working on being more active outside of work and moveing as frequently as possible. She is working on her strength and exercises. She is demonstrating decreased strength results on Lumbar medx and increased strength results on cervical medX.   She continues to benefit from further PT to further progress her function, but she has dififculty being consistent with appts d/t her schedule and appointments available.     Goals:  Pt will: be independent with HEP: PROGRESSING  Pt will: be able to stand for 45 minute pain-free: PROGRESSING  Pt will: be able to drive for 60 miniutes or more without pain: PROGRESSING  Pt will: decrease JENNIFER by 30% and NDI by 5 points to demonstrate improved function: NT today     Plan / Patient Instructions:       Continue with PT per POC.  Plan for next visit: continue cervical MedX DE and rotary torso, lumbar MedX DE, can continue 4 way neck, continue core and cervical strengthening progression    Subjective:     Pain rating: neck is feeling good.  Had an adjustment 2 days ago, felt great, going to chiro weekly.   Pt feels that her pain has improved and less overall. She continues to notice most of her pain with driving. She feels her pain has improved 50% since beginning PT.      Objective:       Lumbar MedX Initial testin17 4-week Re-test 2017   AROM (full=  0-72   lumbar) 0-60 deg 0-57   Max Extension Torque  231# 168#   Average Extension Torque 178# 111#   Flex: ext ratio (ideal 1.4:1) 2.43:1 2.13:1       MED X Testing Cervical Initial - 8-4-2017 4 wk retest 9/1/2017   AROM (full ROM 0-126) 18-96 18-96   Max Torque  227 262#   Flex/ext Ratio (ideal 1.4:1) .7:1 .9:1     Exercises:  Exercise #1: TA sets: x10, 5 sec holds  Comment #1: Scap Sets x10 with 5 sec holds  Exercise #2: Levator stretch: x3 B with 15 sec holds  Comment #2: treadmill 5:00  Exercise #3: Rotary torso: started to R, 90 seconds, 32#  Comment #3: Reviewed: seated figure 4 stretch, trunk flex/ext, trunk rotation  Exercise #4: Chin tucks: verbal review and discussed finding this position in sitting more frequently while driving to decrease FHP  Comment #4: 4 way neck: 26# flexion and SB B, x20 each, seat 1, lateral 3  Exercise #5: standing shoulder stretch, neck rotation, scap sets, back extension.     Treatment Today     TREATMENT MINUTES COMMENTS   Evaluation     Self-care/ Home management     Manual therapy     Neuromuscular Re-education     Therapeutic Activity     Therapeutic Exercises 28 Tested MedX today.   Added supine Deep neck flexor activation exercise and ed on neutral neck with planks.   Gait training     Modality__________________                Total 28    Blank areas are intentional and mean the treatment did not include these items.     Candie Farris, PT, DPT  9/1/2017  11:16 AM

## 2021-06-12 NOTE — PROGRESS NOTES
[facility] Daily Progress Note    Patient Name: Anay ARTEAGA Alison  Date of evaluation: 2017  Today's Date: 2017   Visit Number:  (MedX)  Referral Diagnosis: Myofascial pain  Referring provider: Farrukh Arroyo, *  Visit Diagnosis:     ICD-10-CM    1. Neck pain M54.2    2. Chronic bilateral low back pain without sciatica M54.5     G89.29    3. Neck tightness R29.898    4. Generalized muscle weakness M62.81        Assessment:       Pt has noticed less overall pain levels with stretching and HEP. Pt able to tolerate cervical DE and lumbar DE with MedX. No increases in pain as well. patient may also benefit from 4-way neck for further strengthening. Discussed posture while driving (towel roll in lumbar spine).  Pt will benefit from continuing MedX program and PT for neck, back, and core strengthening.     Goals:  Pt will: be independent with HEP within 3 weeks.  Pt will: be able to stand for 45 minute pain-free within 6 weeks.  Pt will: be able to drive for 60 miniutes or more without pain within 8 weeks.  Pt will: decrease JENNIFER by 30% and NDI by 5 points to demonstrate improved function within 8 weeks.     Plan / Patient Instructions:       Continue with PT per POC.  Plan for next visit: continue cervical MedX DE and rotary torso, lumbar MedX DE, trial 4 way neck if time, continue core and cervical strengthening progression    Subjective:     Pain rating: not rated specifically today  Pt shares that she has been doing fine.      Objective:       Lumbar MedX Initial testin17 4-week Re-test   AROM (full=  0-72  lumbar) 0-60 deg    Max Extension Torque  231#    Average Extension Torque 178#    Flex: ext ratio (ideal 1.4:1) 2.43:1        MED X Testing Cervical Initial - 2017   AROM (full ROM 0-126) 18-96   Max Torque  227   Flex/ext Ratio (ideal 1.4:1) .7:1     Exercises:  Exercise #1: TA sets: x10, 5 sec holds  Comment #1: Scap Sets x10 with 5 sec holds  Exercise #2: Levator stretch:  x3 B with 15 sec holds  Comment #2: Nustep 5:00   Exercise #3: Rotary torso: started to R, 90 seconds, 28#  Comment #3: Reviewed: seated figure 4 stretch, trunk flex/ext, trunk rotation  Exercise #4: Chin tucks: verbal review and discussed finding this position in sitting more frequently while driving to decrease FHP    No pain increases during session    Treatment Today     TREATMENT MINUTES COMMENTS   Evaluation     Self-care/ Home management     Manual therapy     Neuromuscular Re-education     Therapeutic Activity     Therapeutic Exercises 25 See exercises and flowsheets. Lumbar MedX DE and cervical MedX DE   Gait training     Modality__________________                Total 25    Blank areas are intentional and mean the treatment did not include these items.     Candie Farris, PT, DPT  8/11/2017  11:16 AM

## 2021-06-12 NOTE — PROGRESS NOTES
[facility] Daily Progress Note    Patient Name: Anay ARTEAGA Alison  Date of evaluation: 2017  Today's Date: 2017   Visit Number:  (MedX)  Referral Diagnosis: Myofascial pain  Referring provider: Farrukh Arroyo, *  Visit Diagnosis:     ICD-10-CM    1. Neck pain M54.2    2. Chronic bilateral low back pain without sciatica M54.5     G89.29    3. Neck tightness R29.898    4. Generalized muscle weakness M62.81        Assessment:       Pt tolerating 4 way neck today without any increases in pain. Low resistance was performed to determine tolerance. Pt having less overall back pain, but continuing neck pain. Pain continues to be related to muscle tightness. She would benefit from further postural and core strengthening.     Goals:  Pt will: be independent with HEP within 3 weeks.  Pt will: be able to stand for 45 minute pain-free within 6 weeks.  Pt will: be able to drive for 60 miniutes or more without pain within 8 weeks.  Pt will: decrease JENNIFER by 30% and NDI by 5 points to demonstrate improved function within 8 weeks.     Plan / Patient Instructions:       Continue with PT per POC.  Plan for next visit: continue cervical MedX DE and rotary torso, lumbar MedX DE, can continue 4 way neck, continue core and cervical strengthening progression    Subjective:     Pain rating: doing ok today   Gets pain with driving.   Having hard time getting in exercises d/t time.      Objective:       Lumbar MedX Initial testin17 4-week Re-test   AROM (full=  0-72  lumbar) 0-60 deg    Max Extension Torque  231#    Average Extension Torque 178#    Flex: ext ratio (ideal 1.4:1) 2.43:1        MED X Testing Cervical Initial - 2017   AROM (full ROM 0-126) 18-96   Max Torque  227   Flex/ext Ratio (ideal 1.4:1) .7:1     Exercises:  Exercise #1: TA sets: x10, 5 sec holds  Comment #1: Scap Sets x10 with 5 sec holds  Exercise #2: Levator stretch: x3 B with 15 sec holds  Comment #2: treadmill 5:00  Exercise #3:  Rotary torso: started to L, 90 seconds, 30#  Comment #3: Reviewed: seated figure 4 stretch, trunk flex/ext, trunk rotation  Exercise #4: Chin tucks: verbal review and discussed finding this position in sitting more frequently while driving to decrease FHP  Comment #4: 4 way neck: 24# flexion and SB B, x20 each, seat 1, lateral 3  Exercise #5: standing shoulder stretch, neck rotation, scap sets, back extension.     No pain with 4-way neck, occasional cues for pace    Talked through moving during breaks at work. 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     Treatment Today     TREATMENT MINUTES COMMENTS   Evaluation     Self-care/ Home management     Manual therapy     Neuromuscular Re-education     Therapeutic Activity     Therapeutic Exercises 28 See above   Gait training     Modality__________________                Total 28    Blank areas are intentional and mean the treatment did not include these items.     Candie Farris, PT, DPT  8/18/2017  11:16 AM

## 2021-06-12 NOTE — PROGRESS NOTES
[facility] Daily Progress Note    Patient Name: Anay ARTEAGA Alison  Date of evaluation: 2017  Today's Date: 2017   Visit Number:  (MedX)  Referral Diagnosis: Myofascial pain  Referring provider: Farrukh Arroyo, *  Visit Diagnosis:     ICD-10-CM    1. Neck pain M54.2    2. Chronic bilateral low back pain without sciatica M54.5     G89.29    3. Neck tightness R29.898    4. Generalized muscle weakness M62.81        Assessment:       Pt tolerating 4 way neck today without any increases in pain. Low resistance was performed to determine tolerance. Pt having less overall back pain, but continuing neck pain. Pain continues to be related to muscle tightness. She would benefit from further postural and core strengthening.     Goals:  Pt will: be independent with HEP within 3 weeks.  Pt will: be able to stand for 45 minute pain-free within 6 weeks.  Pt will: be able to drive for 60 miniutes or more without pain within 8 weeks.  Pt will: decrease JENNIFER by 30% and NDI by 5 points to demonstrate improved function within 8 weeks.     Plan / Patient Instructions:       Continue with PT per POC.  Plan for next visit: continue cervical MedX DE and rotary torso, lumbar MedX DE, can continue 4 way neck, continue core and cervical strengthening progression    Subjective:     Pain ratin in her neck  Pt shares doing well today. She noticed some increases in neck pain yesterday when sitting for a long time watching tv. She did have relief when doing her stretches. She has noticed less overall back pain and more concerned with her neck.       Objective:       Lumbar MedX Initial testin17 4-week Re-test   AROM (full=  0-72  lumbar) 0-60 deg    Max Extension Torque  231#    Average Extension Torque 178#    Flex: ext ratio (ideal 1.4:1) 2.43:1        MED X Testing Cervical Initial - 2017   AROM (full ROM 0-126) 18-96   Max Torque  227   Flex/ext Ratio (ideal 1.4:1) .7:1     Exercises:  Exercise #1: TA  sets: x10, 5 sec holds  Comment #1: Scap Sets x10 with 5 sec holds  Exercise #2: Levator stretch: x3 B with 15 sec holds  Comment #2: Nustep 5:00   Exercise #3: Rotary torso: started to R, 90 seconds, 28#  Comment #3: Reviewed: seated figure 4 stretch, trunk flex/ext, trunk rotation  Exercise #4: Chin tucks: verbal review and discussed finding this position in sitting more frequently while driving to decrease FHP    No pain with 4-way neck, occasional cues for pace    Reviewed chin tucks and scap sets for posture   Introduced rows for postural progression    Treatment Today     TREATMENT MINUTES COMMENTS   Evaluation     Self-care/ Home management     Manual therapy     Neuromuscular Re-education     Therapeutic Activity     Therapeutic Exercises 28 See exercises and flowsheets. Lumbar MedX DE and cervical MedX DE. Added rows to HEP.   Gait training     Modality__________________                Total 28    Blank areas are intentional and mean the treatment did not include these items.     Shahab Fonseca, PT, DPT  8/14/2017  11:16 AM

## 2021-06-12 NOTE — PROGRESS NOTES
[facility] Daily Progress Note    Patient Name: Anay ARTEAGA Alison  Date of evaluation: 2017  Today's Date: 2017   Visit Number:  (MedX)  Referral Diagnosis: Myofascial pain  Referring provider: Farrukh Arroyo, *  Visit Diagnosis:     ICD-10-CM    1. Neck pain M54.2    2. Chronic bilateral low back pain without sciatica M54.5     G89.29    3. Neck tightness R29.898    4. Generalized muscle weakness M62.81        Assessment:         Pt in good spirits today. Pt has noticed less overall pain levels with stretching and HEP. Pt able to tolerate cervical DE and lumbar testing today with MedX. No increases in pain as well. Back weakness was noted below the norm with lumbar testing today, especially at end ranges of extension. Pt may also benefit from 4-way neck for further strengthening. Pt given chin tucks for HEP and educated on how they may be helpful with her posture while working/driving. Pt will benefit from continuing MedX program and PT for neck, back, and core strengthening.     Goals:  Pt will: be independent with HEP within 3 weeks.  Pt will: be able to stand for 45 minute pain-free within 6 weeks.  Pt will: be able to drive for 60 miniutes or more without pain within 8 weeks.  Pt will: decrease JENNIFER by 30% and NDI by 5 points to demonstrate improved function within 8 weeks.     Plan / Patient Instructions:    Continue with PT per POC.    Plan for next visit: continue cervical MedX DE and rotary torso, initiate lumbar MedX DE, trial 4 way neck if time, review chin tucks, continue core and cervical strengthening progression  Subjective:     Pain ratin  Pt shares that she has been doing fine. She has noticed more pain in her mid-back. She says that it does not last long, but noticeable. She has noticed improvements with      Objective:       Lumbar MedX Initial testin17 4-week Re-test   AROM (full=  0-72  lumbar) 0-60 deg    Max Extension Torque  231#    Average Extension  Torque 178#    Flex: ext ratio (ideal 1.4:1) 2.43:1        MED X Testing Cervical Initial - 8-4-2017   AROM (full ROM 0-126) 18-96   Max Torque  227   Flex/ext Ratio (ideal 1.4:1) .7:1     Exercises:  Exercise #1: TA sets: x10, 5 sec holds  Comment #1: Scap Sets x10 with 5 sec holds  Exercise #2: Levator stretch: x3 B with 15 sec holds  Comment #2: Treadmill: 5 minutes, 2.0 speed  Exercise #3: Rotary torso: started to R, 90 seconds, 26#  Comment #3: Reviewed: seated figure 4 stretch, trunk flex/ext, trunk rotation    Initiated rotary torso, cervical MedX DE, and lumbar MedX testing today    Min and tactile cues for chin tuck technique    Occasional cues for pace with cervical MedX DE    No pain increases during session    Treatment Today     TREATMENT MINUTES COMMENTS   Evaluation     Self-care/ Home management     Manual therapy     Neuromuscular Re-education     Therapeutic Activity     Therapeutic Exercises 30 See exercises and flowsheets. Lumbar MedX testing and cervical MedX DE   Gait training     Modality__________________                Total 30    Blank areas are intentional and mean the treatment did not include these items.     Shahab Fonseca, PT, DPT  8/7/2017  11:16 AM

## 2021-06-13 NOTE — PROGRESS NOTES
[facility] Discharge Summary    Patient Name: Anay Rosas  Date: 10/19/2017  Referral Diagnosis: Neck Pain  Referring provider: Farrukh Arroyo, *      Patient was seen for 9 visits from 7/17/2017 to 9/1/2017 without missed appointments.  See most recent PT note for updated status.   Patient was doing MedX program and didn't return to continue with program. No further PT scheduled.   Did call patient to check status, no call back.   Goals partially met through sessions.    Physical Therapy will be discharged at this time.    Thank you for your referral.  Candie Farris  10/19/2017  11:17 AM